# Patient Record
Sex: MALE | Race: BLACK OR AFRICAN AMERICAN | URBAN - METROPOLITAN AREA
[De-identification: names, ages, dates, MRNs, and addresses within clinical notes are randomized per-mention and may not be internally consistent; named-entity substitution may affect disease eponyms.]

---

## 2017-05-18 ENCOUNTER — HISTORICAL (OUTPATIENT)
Dept: ADMINISTRATIVE | Facility: HOSPITAL | Age: 62
End: 2017-05-18

## 2017-05-23 LAB — FINAL CULTURE: NORMAL

## 2018-02-10 ENCOUNTER — HISTORICAL (OUTPATIENT)
Dept: ADMINISTRATIVE | Facility: HOSPITAL | Age: 63
End: 2018-02-10

## 2018-02-12 LAB
FINAL CULTURE: NORMAL
FINAL CULTURE: NORMAL

## 2018-02-16 LAB
FINAL CULTURE: NORMAL
FINAL CULTURE: NORMAL

## 2018-02-17 LAB
FINAL CULTURE: NORMAL
FINAL CULTURE: NORMAL

## 2018-06-10 ENCOUNTER — HISTORICAL (OUTPATIENT)
Dept: ADMINISTRATIVE | Facility: HOSPITAL | Age: 63
End: 2018-06-10

## 2018-06-17 LAB
FINAL CULTURE: NORMAL

## 2018-07-20 ENCOUNTER — HISTORICAL (OUTPATIENT)
Dept: ADMINISTRATIVE | Facility: HOSPITAL | Age: 63
End: 2018-07-20

## 2018-07-23 LAB
FINAL CULTURE: NORMAL
FINAL CULTURE: NORMAL

## 2019-06-09 DIAGNOSIS — Z76.82 ORGAN TRANSPLANT CANDIDATE: Primary | ICD-10-CM

## 2019-06-09 PROBLEM — I10 HYPERTENSION: Status: ACTIVE | Noted: 2019-06-09

## 2019-06-09 PROBLEM — I25.10 CHRONIC CORONARY ARTERY DISEASE: Status: ACTIVE | Noted: 2019-06-09

## 2019-06-09 PROBLEM — M10.9 GOUT: Status: ACTIVE | Noted: 2019-06-09

## 2019-06-09 PROBLEM — I48.91 ATRIAL FIBRILLATION: Status: ACTIVE | Noted: 2019-06-09

## 2019-06-09 PROBLEM — E78.5 DYSLIPIDEMIA: Status: ACTIVE | Noted: 2019-06-09

## 2019-06-09 PROBLEM — E66.9 OBESITY: Status: ACTIVE | Noted: 2019-06-09

## 2019-06-09 PROBLEM — E11.9 DIABETES MELLITUS: Status: ACTIVE | Noted: 2019-06-09

## 2019-06-09 PROBLEM — R06.02 SHORTNESS OF BREATH: Status: ACTIVE | Noted: 2019-06-09

## 2019-06-09 PROBLEM — N18.6 END STAGE RENAL DISEASE: Status: ACTIVE | Noted: 2018-04-09

## 2019-06-09 PROBLEM — I77.9 PERIPHERAL ARTERIAL OCCLUSIVE DISEASE: Status: ACTIVE | Noted: 2019-06-09

## 2019-06-17 ENCOUNTER — TELEPHONE (OUTPATIENT)
Dept: TRANSPLANT | Facility: CLINIC | Age: 64
End: 2019-06-17

## 2019-06-25 ENCOUNTER — TELEPHONE (OUTPATIENT)
Dept: TRANSPLANT | Facility: CLINIC | Age: 64
End: 2019-06-25

## 2019-07-09 ENCOUNTER — TELEPHONE (OUTPATIENT)
Dept: TRANSPLANT | Facility: CLINIC | Age: 64
End: 2019-07-09

## 2019-08-05 ENCOUNTER — TELEPHONE (OUTPATIENT)
Dept: TRANSPLANT | Facility: CLINIC | Age: 64
End: 2019-08-05

## 2019-08-16 ENCOUNTER — DOCUMENTATION ONLY (OUTPATIENT)
Dept: TRANSPLANT | Facility: CLINIC | Age: 64
End: 2019-08-16

## 2019-08-16 NOTE — NURSING
Nurse spoke with the patient and he states he has an appointments with his local cardiologist next week to determine if he will need an angiogram with stents or bypass surgery. He was informed to call next week after the visit to give us more information. Patients appointments depends on what the cardiologist recommends. He may be turned down for now and be re referred at a later date once his cardiac issues are resolved. We will need medical records and cardiology clearance. GENIE usual have a wait period before the Plavix can be stopped. BM stents does not. Patient verbalized understanding of the above information. All questions answered.

## 2022-04-07 ENCOUNTER — HISTORICAL (OUTPATIENT)
Dept: ADMINISTRATIVE | Facility: HOSPITAL | Age: 67
End: 2022-04-07
Payer: MEDICARE

## 2022-04-23 VITALS
OXYGEN SATURATION: 100 % | HEIGHT: 68 IN | DIASTOLIC BLOOD PRESSURE: 85 MMHG | WEIGHT: 241.38 LBS | SYSTOLIC BLOOD PRESSURE: 176 MMHG | BODY MASS INDEX: 36.58 KG/M2